# Patient Record
Sex: FEMALE | Race: OTHER
[De-identification: names, ages, dates, MRNs, and addresses within clinical notes are randomized per-mention and may not be internally consistent; named-entity substitution may affect disease eponyms.]

---

## 2017-03-11 ENCOUNTER — HOSPITAL ENCOUNTER (EMERGENCY)
Dept: HOSPITAL 36 - ER | Age: 27
Discharge: HOME | End: 2017-03-11
Payer: MEDICAID

## 2017-03-11 DIAGNOSIS — N83.201: Primary | ICD-10-CM

## 2017-03-11 DIAGNOSIS — E11.9: ICD-10-CM

## 2017-03-11 DIAGNOSIS — F17.200: ICD-10-CM

## 2017-03-11 LAB
ALBUMIN/GLOB SERPL: 1.4 {RATIO} (ref 1–1.8)
ALP SERPL-CCNC: 66 U/L (ref 34–104)
ALT SERPL-CCNC: 9 U/L (ref 7–52)
AMPHET UR-MCNC: NEGATIVE NG/ML
ANION GAP SERPL CALC-SCNC: 12 MMOL/L (ref 7–16)
AST SERPL-CCNC: 13 U/L (ref 13–39)
BACTERIA #/AREA URNS HPF: (no result) /HPF
BARBITURATES UR-MCNC: NEGATIVE UG/ML
BASOPHILS NFR BLD AUTO: 0 % (ref 0–2)
BILIRUB SERPL-MCNC: 0.9 MG/DL (ref 0.3–1)
BILIRUB UR-MCNC: NEGATIVE MG/DL
BUN SERPL-MCNC: 11 MG/DL (ref 7–25)
BUN/CREAT SERPL: 15.7
CALCIUM SERPL-MCNC: 9 MG/DL (ref 8.6–10.3)
CHLORIDE SERPL-SCNC: 106 MEQ/L (ref 98–107)
CO2 SERPL-SCNC: 19.6 MEQ/L (ref 21–31)
COLOR UR: YELLOW
CREAT SERPL-MCNC: 0.7 MG/DL (ref 0.6–1.2)
EOSINOPHIL NFR BLD AUTO: 1.4 % (ref 0–5)
EPI CELLS URNS QL MICRO: (no result) /LPF
ERYTHROCYTE [DISTWIDTH] IN BLOOD BY AUTOMATED COUNT: 13.2 % (ref 11.5–20)
GLOBULIN SER-MCNC: 2.8 GM/DL
GLUCOSE SERPL-MCNC: 105 MG/DL (ref 70–105)
GLUCOSE UR STRIP-MCNC: NEGATIVE MG/DL
HCT VFR BLD CALC: 37.8 % (ref 35–45)
HGB BLD-MCNC: 12.6 GM/DL (ref 11.7–15.5)
KETONES UR STRIP-MCNC: NEGATIVE MG/DL
LYMPHOCYTES NFR BLD AUTO: 26.8 % (ref 20–50)
MCH RBC QN AUTO: 28.1 PG (ref 27–31)
MCHC RBC AUTO-ENTMCNC: 33.3 PG (ref 28–36)
MCV RBC AUTO: 84.4 FL (ref 81–100)
MONOCYTES NFR BLD AUTO: 4.5 % (ref 2–10)
NEUTROPHILS # BLD: 5.4 TH/CMM (ref 1.8–8)
NEUTROPHILS NFR BLD AUTO: 67.3 % (ref 40–80)
PH UR STRIP: 7 [PH]
PLATELET # BLD: 141 TH/CMM (ref 150–400)
PMV BLD AUTO: 9.9 FL
POTASSIUM SERPL-SCNC: 3.6 MEQ/L (ref 3.5–5.1)
PROT UR STRIP-MCNC: NEGATIVE MG/DL
RBC # BLD AUTO: 4.48 MIL/CMM (ref 3.8–5.1)
RBC # UR STRIP: NEGATIVE /UL
RBC #/AREA URNS HPF: (no result) /HPF (ref 0–5)
SODIUM SERPL-SCNC: 134 MEQ/L (ref 136–145)
UROBILINOGEN UR STRIP-ACNC: 0.2 E.U./DL (ref 0.2–1)
WBC # BLD AUTO: 8.1 TH/CMM (ref 4.8–10.8)
WBC #/AREA URNS HPF: (no result) /HPF (ref 0–5)

## 2017-03-11 PROCEDURE — 36415 COLL VENOUS BLD VENIPUNCTURE: CPT

## 2017-03-11 PROCEDURE — 81001 URINALYSIS AUTO W/SCOPE: CPT

## 2017-03-11 PROCEDURE — 80053 COMPREHEN METABOLIC PANEL: CPT

## 2017-03-11 PROCEDURE — 99285 EMERGENCY DEPT VISIT HI MDM: CPT

## 2017-03-11 PROCEDURE — 82150 ASSAY OF AMYLASE: CPT

## 2017-03-11 PROCEDURE — 85025 COMPLETE CBC W/AUTO DIFF WBC: CPT

## 2017-03-11 PROCEDURE — 76705 ECHO EXAM OF ABDOMEN: CPT

## 2017-03-11 PROCEDURE — 96374 THER/PROPH/DIAG INJ IV PUSH: CPT

## 2017-03-11 PROCEDURE — 76856 US EXAM PELVIC COMPLETE: CPT

## 2017-03-11 PROCEDURE — 96375 TX/PRO/DX INJ NEW DRUG ADDON: CPT

## 2017-03-11 PROCEDURE — 83690 ASSAY OF LIPASE: CPT

## 2017-03-11 PROCEDURE — 80300: CPT

## 2017-03-11 PROCEDURE — 81025 URINE PREGNANCY TEST: CPT

## 2017-03-11 NOTE — ED PHYSICIAN CHART
Chief Complaint/HPI





- Patient Information


Date Seen:: 17


Time Seen:: 10:14


Chief Complaint:: ABDOMINAL PAIN X 30 MINUTES


History of Present Illness:: 





This 26-year-old female presents with lower abdominal pain that began 30 

minutes prior to arrival.  She was in bed at the time and the onset was abrupt.

  She describes the pain as crushing and it does not radiate into the back. The 

severity of the pain is rated at a 10 over 10.  There are no exacerbating or 

relieving features. The patient is  4, para 4 AB 0.  She delivered her 

most recent child 2 months ago.  There were no complications during the 

pregnancy.  The pain is accompanied by nausea with no vomiting or diarrhea.  

She denies any vaginal bleeding but does have urinary frequency.  No dysuria or 

hematuria.  


Allergies:: 


 Allergies











Allergy/AdvReac Type Severity Reaction Status Date / Time


 


No Known Allergies Allergy   Verified 16 18:42














Review of Systems





- Review of Systems


General/Constitutional: No fever, No chills, No weakness, No edema


Skin: No skin lesions, No rash, No bruising


Head: No headache, No light-headedness


Eyes: No loss of vision, No diplopia


ENT: No earache, No sore throat, Tinnitus


Neck: No neck pain, No stiffness, No mass noted


Cardio Vascular: No chest pain, No palpitations, No orthopnea, No edema


Pulmonary: No SOB, No cough, No sputum


GI: Nausea, No vomiting, No diarrhea, No constipation, No hematemesis


G/U: No dysuria, Frequency, No hematuria


Ob/Gyn: No vaginal discharge, No abnormal vaginal bleed


Musculoskeletal: No bone or joint pain, No back pain, No muscle pain


Psychiatric: No prior psych history


Hematopoietic: No bruising, No lymphadenopathy


Allergic/Immuno: No urticaria, No angioedema


Neurological: No syncope, No focal symptoms, No weakness, No headache, No 

seizure, No dizziness, No confusion, No vertigo





Past Medical History





- Past Medical History


Past Medical History: Other (known diabetes, no hypertension, no asthma, no 

prior surgeries.)


Social History: Smoker, Alcohol, No Drug Use, Single


Employment:: 





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





Family Medical History





- Family Member


  ** Father


History Unknown: Yes


Living Status: Still Living


Hx Family Cancer: Yes (Colon)


Hx Family Hypertension: Yes


Hx Family Diabetes: Yes





Physical Exam





- Physical Examination


General/Constitutional: Awake, Well-developed, well-nourished, Alert, Non-toxic 

appearing, Ambulatory


Other Gen/Cons comments:: 





Appears to be in moderate to severe pain left first on her.  15 minutes 

following the administration of Zofran and morphine the patient said she was 

still having considerable pain.  1 mg of DILAUDID was ordered.


Head: Atraumatic


Eyes: Lids, conjuctiva normal, PERRL, EOMI


Skin: No rash, No skin lesions, No ecchymosis, Well hydrated


ENMT: External ears, nose nl, Nasal exam nl, Lips, teeth, gums nl, Oropharynx nl

, Tonsils nl


Other ENMT comments:: 





Normal epiglottis was visualized.


Neck: Nontender, Full ROM w/o pain, No JVD, No nuchal rigidity, No mass, No 

stridor


Respiratory: Nl effort/Exclusion, Clear to Auscultation, No Wheeze/Rhonchi/Rales


Cardio Vascular: RRR, No murmur, gallop, rubs, NL S1 S2


Other Cardio Vascular comments:: 





Good pulses in all 4 extremities.


GI: No organomegaly, No hernia, Normal BS's, Nondistended


Other GI comments:: 





Patient's abdomen is soft with tenderness in the right lower quadrant without 

associated rebound or guarding.  There is also mild tenderness in the 

suprapubic and left lower quadrant.


: No CVA tenderness


Extremities: No tenderness or effusion, Full ROM, normal strength in all 

extremities, No edema


Other Extremities comments:: 





Patient had a positive psoas sign and negative obturator sign.


Neuro/Psych: Alert/oriented, Normal sensory exam, Normal motor strength, 

Judgement/insight normal, Mood normal, No focal deficits


Misc: Normal back, No paraspinal tenderness





Labs/Radiology/EKG Results





- Lab Results


Results: 





 Laboratory Tests











  17





  10:21 10:21 10:21


 


WBC  8.1  


 


RBC  4.48  


 


Hgb  12.6  


 


Hct  37.8  D  


 


MCV  84.4  


 


MCH  28.1  


 


MCHC Differential  33.3  


 


RDW  13.2  


 


Plt Count  141 L  


 


MPV  9.9  


 


Neutrophils %  67.3  


 


Lymphocytes %  26.8  


 


Monocytes %  4.5  


 


Eosinophils %  1.4  


 


Basophils %  0.0  


 


Sodium   134 L 


 


Potassium   3.6 


 


Chloride   106 


 


Carbon Dioxide   19.6 L 


 


Anion Gap   12.0 


 


BUN   11 


 


Creatinine   0.7 


 


Est GFR ( Amer)   > 60.0 


 


Est GFR (Non-Af Amer)   > 60.0 


 


BUN/Creatinine Ratio   15.7 


 


Glucose   105 


 


Calcium   9.0 


 


Total Bilirubin   0.9 


 


AST   13 


 


ALT   9 


 


Alkaline Phosphatase   66 


 


Total Protein   6.6 


 


Albumin   3.8 


 


Globulin   2.8 


 


Albumin/Globulin Ratio   1.4 


 


Amylase    32


 


Lipase   














  17





  10:21


 


WBC 


 


RBC 


 


Hgb 


 


Hct 


 


MCV 


 


MCH 


 


MCHC Differential 


 


RDW 


 


Plt Count 


 


MPV 


 


Neutrophils % 


 


Lymphocytes % 


 


Monocytes % 


 


Eosinophils % 


 


Basophils % 


 


Sodium 


 


Potassium 


 


Chloride 


 


Carbon Dioxide 


 


Anion Gap 


 


BUN 


 


Creatinine 


 


Est GFR ( Amer) 


 


Est GFR (Non-Af Amer) 


 


BUN/Creatinine Ratio 


 


Glucose 


 


Calcium 


 


Total Bilirubin 


 


AST 


 


ALT 


 


Alkaline Phosphatase 


 


Total Protein 


 


Albumin 


 


Globulin 


 


Albumin/Globulin Ratio 


 


Amylase 


 


Lipase  11








Laboratory studies: Original leukocytosis or anemia present.  The platelets are 

slightly depressed.  There was mild hyponatremia with a sodium of 134, which is 

not clinically significant.  Renal and liver function tests were all within 

normal parameters.  Ultrasound of the abdomen and pelvis showed a 2.3 cm right 

ovarian cyst.  There is also presence of free fluid in the pelvic region.





Assessment





- Assessment


General Assessment: 





: CASE SUMMARY: This 26-year-old female presents with lower abdominal pain that 

was present when she awoke this morning.  The pain is localized to the 

suprapubic region is not radiate.  Physical examination there is mild right 

lower quadrant tenderness and suprapubic tenderness.  There was no rebound or 

guarding noted.  Laboratory studies showed no leukocytosis or significant 

anemia.  The urinalysis was negative for any evidence of a UTI.  Examination of 

the abdomen and pelvis failed to visualize the appendix.  She had a 2.3 L 

ovarian cyst on the right side.  There was also the presence of free fluid.  

The patient was discharged with a prescription for ibuprofen 600 mg and Norco 

7.5/325.  He is given the usual precautions against mixing it with alcohol and 

not driving within 6 hours of taking the Norco.  She was further advised to 

follow-up with her primary care physician this coming week if the pain is not 

resolved.  Advised to return to the emergency department if there is any 

significant worsening of the pain or nausea and vomiting.  Discharged in good 

condition.





MDM:  DDX For Lower abdominal pain:  NOT Acute appendicitis based on the patient

's history, physical examination and ultrasound results.  No leukocytosis also 

argues for no acute appendicitis. NOT Ectopic pregnancy based on negative 

pregnancy test. NOT mesenteric ischemia based on history and exam.





ED Septic Shock





- .


Is Septic Shock (SBP<90, OR Lactate>4 mmol\L) present?: No





Reassessment (Disposition)





- Reassessment


Reassessment Condition:: Improved





- Diagnosis


Diagnosis:: 





Right side ruptured ovarian cyst.





ED Discharge Plan





- Patient Disposition


Instructions:  Ovarian Cyst

## 2017-03-12 NOTE — DIAGNOSTIC IMAGING REPORT
Pelvic ultrasound



HISTORY: Pain



Transabdominal and transvaginal sonographic technique were utilized.



There is a normal uterine size (9.6 x 5.1 x 4.9 cm). No focal myometrial

lesions are seen. The endometrium is slightly prominent (9 mm

thickness).



The right ovary measures 3.7 x 3.1 x 2.4 cm. This is associated with a

2.3 cm cyst. The left ovary and left adnexal region is unremarkable. No

free fluid in the pelvis.



IMPRESSION:

1. 2.3 cm right ovarian cyst

2. Prominent endometrium (9 mm). The findings should be correlated

clinically and with menstrual status.

## 2017-03-12 NOTE — DIAGNOSTIC IMAGING REPORT
Abdominal ultrasound (limited, right lower quadrant)



HISTORY: Pain



Exam is limited to the right lower quadrant of the abdomen.



No focal abnormalities are seen. No abnormal soft tissue masses or

abnormal fluid collections. The appendix cannot be outlined.



The remainder of the intra-abdominal organs were not evaluated this

time.



IMPRESSION:

1. Negative examination.



If necessary, a CT scan would provide additional detail.

## 2017-08-25 ENCOUNTER — HOSPITAL ENCOUNTER (EMERGENCY)
Dept: HOSPITAL 36 - ER | Age: 27
Discharge: LEFT BEFORE DISCHARGE | End: 2017-08-25
Payer: MEDICAID

## 2017-08-25 DIAGNOSIS — Z3A.00: ICD-10-CM

## 2017-08-25 DIAGNOSIS — O03.4: Primary | ICD-10-CM

## 2017-08-25 LAB
ALBUMIN/GLOB SERPL: 1.4 {RATIO} (ref 1–1.8)
ALP SERPL-CCNC: 71 U/L (ref 34–104)
ALT SERPL-CCNC: 9 U/L (ref 7–52)
ANION GAP SERPL CALC-SCNC: 7.6 MMOL/L (ref 7–16)
AST SERPL-CCNC: 13 U/L (ref 13–39)
BACTERIA #/AREA URNS HPF: (no result) /HPF
BASOPHILS NFR BLD AUTO: 0.9 % (ref 0–2)
BILIRUB SERPL-MCNC: 0.4 MG/DL (ref 0.3–1)
BILIRUB UR-MCNC: NEGATIVE MG/DL
BUN SERPL-MCNC: 15 MG/DL (ref 7–25)
BUN/CREAT SERPL: 18.8
CALCIUM SERPL-MCNC: 8.7 MG/DL (ref 8.6–10.3)
CHLORIDE SERPL-SCNC: 109 MEQ/L (ref 98–107)
CHOLEST SERPL-MCNC: 152 MG/DL (ref ?–200)
CO2 SERPL-SCNC: 22.9 MEQ/L (ref 21–31)
COLOR UR: (no result)
CREAT SERPL-MCNC: 0.8 MG/DL (ref 0.6–1.2)
EOSINOPHIL NFR BLD AUTO: 3 % (ref 0–5)
EPI CELLS URNS QL MICRO: (no result) /LPF
ERYTHROCYTE [DISTWIDTH] IN BLOOD BY AUTOMATED COUNT: 12.9 % (ref 11.5–20)
GLOBULIN SER-MCNC: 2.8 GM/DL
GLUCOSE SERPL-MCNC: 100 MG/DL (ref 70–105)
GLUCOSE UR STRIP-MCNC: 100 MG/DL
HCT VFR BLD CALC: 36.1 % (ref 35–45)
HGB BLD-MCNC: 12.6 GM/DL (ref 11.7–15.5)
KETONES UR STRIP-MCNC: 15 MG/DL
LYMPHOCYTES NFR BLD AUTO: 32.2 % (ref 20–50)
MCH RBC QN AUTO: 29 PG (ref 27–31)
MCHC RBC AUTO-ENTMCNC: 34.8 PG (ref 28–36)
MCV RBC AUTO: 83.6 FL (ref 81–100)
MONOCYTES NFR BLD AUTO: 6.8 % (ref 2–10)
NEUTROPHILS # BLD: 4.1 TH/CMM (ref 1.8–8)
NEUTROPHILS NFR BLD AUTO: 57.1 % (ref 40–80)
PH UR STRIP: 6.5 [PH] (ref 4.6–8)
PLATELET # BLD: 165 TH/CMM (ref 150–400)
PMV BLD AUTO: 9.9 FL
POTASSIUM SERPL-SCNC: 3.5 MEQ/L (ref 3.5–5.1)
PROT UR STRIP-MCNC: >=300 MG/DL
RBC # BLD AUTO: 4.33 MIL/CMM (ref 3.8–5.1)
RBC # UR STRIP: (no result) /UL
RBC #/AREA URNS HPF: >100 /HPF (ref 0–5)
SODIUM SERPL-SCNC: 136 MEQ/L (ref 136–145)
TRIGL SERPL-MCNC: 177 MG/DL (ref ?–150)
UROBILINOGEN UR STRIP-ACNC: 4 E.U./DL (ref 0.2–1)
WBC # BLD AUTO: 7.3 TH/CMM (ref 4.8–10.8)
WBC #/AREA URNS HPF: (no result) /HPF (ref 0–5)

## 2017-08-25 NOTE — ED PHYSICIAN CHART
Chief Complaint/HPI





- Patient Information


Date Seen:: 17


Time Seen:: 16:55


Chief Complaint:: PELVIC PAIN


History of Present Illness:: 





THIS IS A 26 YO WITH INTERMITTENT BOUTS OF VAGINAL BLEEDING OVER THE PAST FEW 

WEEKS WITH A POSITIVE PREGNANCY TEST.  SHE IS CONCERNED BECAUSE SHE IS NOT SURE 

SHE IS REALLY PREGNANT BECAUSE ALL HER TEST HAVE BEEN URINE TEST. SHE DENIES 

FEVER, NAUSEA AND VOMITING.


Allergies:: 


 Allergies











Allergy/AdvReac Type Severity Reaction Status Date / Time


 


No Known Allergies Allergy   Verified 16 18:42











Vitals:: 


 Vital Signs - 8 hr











  17





  16:51


 


Temp 98.1 F


 


HR 86


 


RR 16


 


/64


 


O2 Sat % 96











Historian:: Patient


Review:: Nurse's Note Reviewed, Old Chart Reviewed





Review of Systems





- Review of Systems


General/Constitutional: No fever, No chills, No weight loss, No weakness, No 

diaphoresis, No edema, No loss of appetite


Skin: No skin lesions, No rash, No bruising


Head: No headache, No light-headedness


Eyes: No loss of vision, No pain, No diplopia


ENT: No earache, No nasal drainage, No sore throat, No tinnitus


Neck: No neck pain, No swelling, No thyromegaly, No stiffness, No mass noted


Cardio Vascular: No chest pain, No palpitations, No PND, No orthopnea, No edema


Pulmonary: No SOB, No cough, No sputum, No wheezing


GI: No nausea, No vomiting, No diarrhea, No pain, No melena, No hematochezia, 

No constipation, No hematemesis


G/U: No dysuria, No frequency, No hematuria


Ob/Gyn: Abnormal vaginal bleeding


Musculoskeletal: No bone or joint pain, No back pain, No muscle pain


Endocrine: No polyuria, No polydipsia


Psychiatric: No prior psych history, No depression, No anxiety, No suicidal 

ideation


Hematopoietic: No bruising, No lymphadenopathy


Allergic/Immuno: No urticaria, No angioedema


Neurological: No syncope, No focal symptoms, No weakness, No paresthesia, No 

headache, No seizure, No dizziness, No confusion, No vertigo





Past Medical History





- Past Medical History


Obtainable: Yes


Past Medical History: No significant medical hx


Family History: None


Social History: Non Smoker, No Alcohol, No Drug Use, 


Surgical History: None


Psychiatricy History: None


Medication: Reviewed





Family Medical History





- Family Member


  ** Father


History Unknown: Yes


Ethnicity: 


Living Status: Still Living


Hx Family Cancer: Yes (Colon)


Hx Family Coronary Artery Disease: No


Hx Family Congestive Heart Failure: No


Hx Family Hypertension: Yes


Hx Family Stroke: No


Hx Family Diabetes: Yes


Hx Family Seizures: No


Hx Family Dementia: No


Hx Family AIDS: No


Hx Family HIV: No


Hx Family COPD: No


Hx Family Hepatitis: No


Hx Family Psychiatric Problems: No


Hx Family Tuberculosis: No





Physical Exam





- Physical Examination


General/Constitutional: Awake, Well-developed, well-nourished, Alert, No 

distress, GCS 15, Non-toxic appearing, Ambulatory


Head: Atraumatic


Eyes: Lids, conjuctiva normal, PERRL, EOMI


Skin: Nl inspection, No rash, No skin lesions, No ecchymosis, Well hydrated, No 

lymphadenopathy


ENMT: External ears, nose nl, Nasal exam nl, Lips, teeth, gums nl


Neck: Nontender, Full ROM w/o pain, No JVD, No nuchal rigidity, No bruit, No 

mass, No stridor


Respiratory: Nl effort/Exclusion, Clear to Auscultation, No Wheeze/Rhonchi/Rales


Cardio Vascular: RRR, No murmur, gallop, rubs, NL S1 S2


GI: No tenderness/rebounding/guarding, No organomegaly, No hernia, Normal BS's, 

Nondistended, No mass/bruits, No McBurney tenderness


: No CVA tenderness


Other  comments:: 





PELVIC AREA TENDERNESS ON THE RIGHT SIDE


Extremities: No tenderness or effusion, Full ROM, normal strength in all 

extremities, No edema, Normal digits & nails


Neuro/Psych: Alert/oriented, DTR's symmetric, Normal sensory exam, Normal motor 

strength, Judgement/insight normal, Mood normal, Normal gait, No focal deficits


Misc: normal gait, Normal back, No paraspinal tenderness





Labs/Radiology/EKG Results





- Lab Results


Results: 


 Laboratory Tests











  17





  16:21 16:21


 


WBC  7.3 


 


RBC  4.33 


 


Hgb  12.6 


 


Hct  36.1 


 


MCV  83.6 


 


MCH  29.0 


 


MCHC Differential  34.8 


 


RDW  12.9 


 


Plt Count  165 


 


MPV  9.9 


 


Neutrophils %  57.1 


 


Lymphocytes %  32.2 


 


Monocytes %  6.8 


 


Eosinophils %  3.0 


 


Basophils %  0.9 


 


Serum Pregnancy, Qual   POSITIVE H














- Radiology Results


Results: 





PELVIC ULTRASOUND = INCOMPLETE 


RIGHT OVARIAN MASS.





Assessment





- Assessment


General Assessment: 





EARLY PREGNANCY


VAGINAL BLEEDING


INCOMPLETEE 





ED Septic Shock





- .


Is Septic Shock (SBP<90, OR Lactate>4 mmol\L) present?: No





- <6hrs of presentation:


Vital Signs: 


 Vital Signs - 8 hr











  17





  16:51


 


Temp 98.1 F


 


HR 86


 


RR 16


 


/64


 


O2 Sat % 96














Reassessment (Disposition)





- Reassessment


Reassessment Condition:: Improved





- Diagnosis


Diagnosis:: 





IMCOMPLETE 


VAGINAL BLEEDING


EARLY PREGNANCY





- Patient Disposition


Discharge/Transfer:: Against Medical Advice (THE PATIENT ELECTED NOT TO BE 

TRANSFERED BY AMBULANCE AND WILL GO LATER ON TONIGHT OR IN THE AM.  SHE WAS 

ENCOURAGED TO STAY AND WAIT FOR THE AMBULANCE TRANSPORT TO A [facility]N HOS[facility]ITAL.)





ED Discharge Plan





- Patient Disposition


Admit/Discharge/Transfer: AGAINST MEDICAL ADVICE


Condition at Disposition: Improved

## 2017-08-26 NOTE — DIAGNOSTIC IMAGING REPORT
Exam: Ultrasound examination of pelvis



HISTORY: Metastatic pregnancy.



Findings:



Real-time ultrasound examination of the pelvis was performed multiple

planes utilizing transvaginal technique.



The study demonstrates normal echogenicity uterus measuring 10.6 x 4.8 x

7.1 cm diameter with thickened endometrial canal up to 1.2 cm. There is

no evidence for fetal sac or fetal pole at this time.



The right ovary measures 6.2 x 3.8 x 4.7 cm. Left ovary measures 4.8 x

2.7 x 3 cm. There is a question of a small heterogeneous structure in

right ovary measuring 1.7 x 1.8 cm, small hemorrhagic cyst cannot be

excluded clinical correlation follow-up dictation recommended.



IMPRESSION: No evidence for intrauterine gestational sac or fetal pole,

if positive hCG titers, follow-up examination recommended.



Ectatic pregnancy cannot be excluded. Thickening of the endometrial

canal up to 1.2 cm.



Question of a right ovarian hemorrhagic cyst measuring 1.8 cm

## 2019-05-11 ENCOUNTER — HOSPITAL ENCOUNTER (EMERGENCY)
Dept: HOSPITAL 4 - SED | Age: 29
Discharge: HOME | End: 2019-05-11
Payer: COMMERCIAL

## 2019-05-11 VITALS — BODY MASS INDEX: 27.31 KG/M2 | HEIGHT: 64 IN | WEIGHT: 160 LBS

## 2019-05-11 VITALS — SYSTOLIC BLOOD PRESSURE: 130 MMHG

## 2019-05-11 DIAGNOSIS — Z3A.01: ICD-10-CM

## 2019-05-11 DIAGNOSIS — O20.0: Primary | ICD-10-CM

## 2019-05-11 LAB
ALBUMIN SERPL BCP-MCNC: 3.3 G/DL (ref 3.4–4.8)
ALT SERPL W P-5'-P-CCNC: 22 U/L (ref 12–78)
ANION GAP SERPL CALCULATED.3IONS-SCNC: 10 MMOL/L (ref 5–15)
APPEARANCE UR: CLEAR
AST SERPL W P-5'-P-CCNC: 12 U/L (ref 10–37)
BACTERIA URNS QL MICRO: (no result) /HPF
BASOPHILS # BLD AUTO: 0 K/UL (ref 0–0.2)
BASOPHILS NFR BLD AUTO: 0.3 % (ref 0–2)
BILIRUB SERPL-MCNC: 0.4 MG/DL (ref 0–1)
BILIRUB UR QL STRIP: NEGATIVE
BUN SERPL-MCNC: 12 MG/DL (ref 8–21)
CALCIUM SERPL-MCNC: 8.4 MG/DL (ref 8.4–11)
CHLORIDE SERPL-SCNC: 105 MMOL/L (ref 98–107)
COLOR UR: YELLOW
CREAT SERPL-MCNC: 0.77 MG/DL (ref 0.55–1.3)
EOSINOPHIL # BLD AUTO: 0.1 K/UL (ref 0–0.4)
EOSINOPHIL NFR BLD AUTO: 1 % (ref 0–4)
ERYTHROCYTE [DISTWIDTH] IN BLOOD BY AUTOMATED COUNT: 13.8 % (ref 9–15)
GFR SERPL CREATININE-BSD FRML MDRD: 114 ML/MIN (ref 90–?)
GLUCOSE SERPL-MCNC: 90 MG/DL (ref 70–99)
GLUCOSE UR STRIP-MCNC: NEGATIVE MG/DL
HCT VFR BLD AUTO: 37 % (ref 36–48)
HGB BLD-MCNC: 12.5 G/DL (ref 12–16)
HGB UR QL STRIP: (no result)
KETONES UR STRIP-MCNC: (no result) MG/DL
LEUKOCYTE ESTERASE UR QL STRIP: NEGATIVE
LYMPHOCYTES # BLD AUTO: 2.3 K/UL (ref 1–5.5)
LYMPHOCYTES NFR BLD AUTO: 28.8 % (ref 20.5–51.5)
MCH RBC QN AUTO: 30 PG (ref 27–31)
MCHC RBC AUTO-ENTMCNC: 34 % (ref 32–36)
MCV RBC AUTO: 88 FL (ref 79–98)
MONOCYTES # BLD MANUAL: 0.4 K/UL (ref 0–1)
MONOCYTES # BLD MANUAL: 5.1 % (ref 1.7–9.3)
NEUTROPHILS # BLD AUTO: 5.3 K/UL (ref 1.8–7.7)
NEUTROPHILS NFR BLD AUTO: 64.8 % (ref 40–70)
NITRITE UR QL STRIP: NEGATIVE
PH UR STRIP: 7.5 [PH] (ref 5–8)
PLATELET # BLD AUTO: 155 K/UL (ref 130–430)
POTASSIUM SERPL-SCNC: 3.7 MMOL/L (ref 3.5–5.1)
PROT UR QL STRIP: NEGATIVE
RBC # BLD AUTO: 4.19 MIL/UL (ref 4.2–6.2)
RBC #/AREA URNS HPF: (no result) /HPF (ref 0–3)
SODIUM SERPLBLD-SCNC: 136 MMOL/L (ref 136–145)
SP GR UR STRIP: 1.01 (ref 1–1.03)
UROBILINOGEN UR STRIP-MCNC: 0.2 MG/DL (ref 0.2–1)
WBC # BLD AUTO: 8.1 K/UL (ref 4.8–10.8)
WBC #/AREA URNS HPF: (no result) /HPF (ref 0–3)

## 2019-05-11 PROCEDURE — 86900 BLOOD TYPING SEROLOGIC ABO: CPT

## 2019-05-11 PROCEDURE — 86901 BLOOD TYPING SEROLOGIC RH(D): CPT

## 2019-05-11 PROCEDURE — 99284 EMERGENCY DEPT VISIT MOD MDM: CPT

## 2019-05-11 PROCEDURE — 85025 COMPLETE CBC W/AUTO DIFF WBC: CPT

## 2019-05-11 PROCEDURE — 96372 THER/PROPH/DIAG INJ SC/IM: CPT

## 2019-05-11 PROCEDURE — 81000 URINALYSIS NONAUTO W/SCOPE: CPT

## 2019-05-11 PROCEDURE — 76817 TRANSVAGINAL US OBSTETRIC: CPT

## 2019-05-11 PROCEDURE — 80053 COMPREHEN METABOLIC PANEL: CPT

## 2019-05-11 PROCEDURE — 76801 OB US < 14 WKS SINGLE FETUS: CPT

## 2019-05-11 PROCEDURE — 86886 COOMBS TEST INDIRECT TITER: CPT

## 2019-05-11 PROCEDURE — 36415 COLL VENOUS BLD VENIPUNCTURE: CPT

## 2019-05-11 PROCEDURE — 84702 CHORIONIC GONADOTROPIN TEST: CPT
